# Patient Record
Sex: FEMALE | Race: WHITE | NOT HISPANIC OR LATINO | Employment: FULL TIME | URBAN - METROPOLITAN AREA
[De-identification: names, ages, dates, MRNs, and addresses within clinical notes are randomized per-mention and may not be internally consistent; named-entity substitution may affect disease eponyms.]

---

## 2023-06-04 ENCOUNTER — OFFICE VISIT (OUTPATIENT)
Dept: URGENT CARE | Facility: CLINIC | Age: 58
End: 2023-06-04
Payer: COMMERCIAL

## 2023-06-04 VITALS
OXYGEN SATURATION: 98 % | RESPIRATION RATE: 16 BRPM | DIASTOLIC BLOOD PRESSURE: 66 MMHG | SYSTOLIC BLOOD PRESSURE: 90 MMHG | TEMPERATURE: 97.1 F | HEART RATE: 78 BPM

## 2023-06-04 DIAGNOSIS — S10.86XA INSECT BITE OF OTHER PART OF NECK, INITIAL ENCOUNTER: Primary | ICD-10-CM
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
## 2023-06-04 DIAGNOSIS — R21 RASH: ICD-10-CM

## 2023-06-04 PROBLEM — F41.9 ANXIETY: Status: ACTIVE | Noted: 2023-02-14

## 2023-06-04 PROCEDURE — 99203 OFFICE O/P NEW LOW 30 MIN: CPT | Performed by: ORTHOPAEDIC SURGERY

## 2023-06-04 RX ORDER — ESTRADIOL 0.1 MG/G
CREAM VAGINAL
COMMUNITY
Start: 2023-05-21

## 2023-06-04 RX ORDER — DOXYCYCLINE 100 MG/1
100 TABLET ORAL 2 TIMES DAILY
Qty: 28 TABLET | Refills: 0 | Status: SHIPPED | OUTPATIENT
Start: 2023-06-04 | End: 2023-06-18

## 2023-06-04 RX ORDER — BUPROPION HYDROCHLORIDE 150 MG/1
1 TABLET ORAL DAILY
COMMUNITY
Start: 2023-04-15

## 2023-06-04 RX ORDER — LORAZEPAM 1 MG/1
TABLET ORAL
COMMUNITY
Start: 2023-03-07

## 2023-06-04 NOTE — PATIENT INSTRUCTIONS
Suspicious bullseye rash  Will treat with 14 days of Doxycycline  Please take as directed  Monitor for worsening symptoms, f/u with PCP to discuss needs for antibody testing for lyme's disease in 4-6 weeks  F/u as needed for persistent or worsening symptoms  Tick Bite   WHAT YOU NEED TO KNOW:   What do I need to know about a tick bite? Ticks need to be removed quickly  Most tick bites are not dangerous, but ticks can pass disease or infection when they bite  Diseases include Lyme disease, babesiosis, tularemia, and [place] Spotted Fever  What are the signs and symptoms of a tick bite? Signs and symptoms may develop right away, or weeks or even months after a bite  You may have redness, pain, itching, and swelling near the bite area  Blisters may also develop  You may develop tick paralysis, a temporary condition that causes problems with leg movement  A disease from a tick bite may cause a fever, rash, body aches, or breathing problems  How do I remove a tick? Remove the tick as soon as possible to help prevent disease or infection  You are less likely to get sick from a tick bite if you remove the tick within 24 hours  Do not use petroleum jelly, nail polish, rubbing alcohol, or heat  These do not work and may be dangerous  Do the following to remove a tick:  First, try a soapy cotton ball  Soak a cotton ball in liquid soap  Cover the tick with the cotton ball for 30 seconds  The tick may come off with the cotton ball when you pull it away  Use tweezers if the soapy cotton ball does not work  Grasp the tick as close to your skin as possible  Pull the tick straight up and out  Do not touch the tick with your bare hands  Do not twist or jerk the tick suddenly, because this may break off the tick's head or mouth parts  Do not leave any part of the tick in your skin  Do not crush or squeeze the tick since its body may be infected with germs  Flush the tick down the toilet      After the tick is removed, clean the area of the bite with rubbing alcohol  Then wash your hands with soap and water  How is a tick bite treated? Treatment for a disease passed during the bite depends on the disease  You may only need medicines to lower a fever or fight a bacterial infection  More serious diseases can cause conditions such as breathing problems that need to be treated in a hospital  The following can help treat symptoms at the bite area:  Apply ice  on your bite for 15 to 20 minutes every hour or as directed  Use an ice pack, or put crushed ice in a plastic bag  Cover it with a towel before you apply it to your skin  Ice helps prevent tissue damage and decreases swelling and pain  Medicines  help decrease pain, redness, itching, and swelling  You may also need medicine to prevent or fight a bacterial infection  These medicines may be given as a cream, lotion, or pill  How can I prevent a tick bite? Ticks live in areas covered by brush and grass  They may even be found in your lawn if you live in certain areas  Outdoor pets can carry ticks inside the house  Ticks can grab onto you or your clothes when you walk by grass or brush  If you go into areas that contain many trees, tall grasses, and underbrush, do the following:  Wear light colored pants and a long-sleeved shirt  Tuck your pants into your socks or boots  Tuck in your shirt  Wear sleeves that fit close to the skin at your wrists and neck  This will help prevent ticks from crawling through gaps in your clothing and onto your skin  Wear a hat in areas with trees  Apply insect repellant on your skin  The insect repellant should contain DEET  Do not put insect repellant on skin that is cut, scratched, or irritated  Always use soap and water to wash the insect repellant off as soon as possible once you are indoors  Do not apply insect repellant on your child's face or hands  Spray insect repellant onto your clothes  Use permethrin spray   This spray kills ticks that crawl on your clothing  Be sure to spray the tops of your boots, bottom of pant legs, and sleeve cuffs  As soon as possible, wash and dry clothing in hot water and high heat  Check your clothing, hair, and skin for ticks  Shower within 2 hours of coming indoors  Carefully check the hairline, armpits, neck, and waist  Check your pets and children for ticks  Remove ticks from pets the same way as you remove them from people  Decrease the risk for ticks in your yard  Ticks like to live in shady, moist areas  [employer] your lawn regularly to keep the grass short  Trim the grass around birdbaths and fences  Cut branches that are overgrown and take them out of the yard  Clear out leaf piles  [employer] firewood in a dry, abby area  When should I seek immediate care? You have trouble walking or moving your legs  You have joint pain, muscle pain, or muscle weakness within 1 month of a tick bite  You have a fever, chills, headache, or rash  When should I call my doctor? You cannot remove the tick  The tick's head is stuck in your skin  You have questions or concerns about your condition or care  CARE AGREEMENT:   You have the right to help plan your care  Learn about your health condition and how it may be treated  Discuss treatment options with your healthcare providers to decide what care you want to receive  You always have the right to refuse treatment  The above information is an  only  It is not intended as medical advice for individual conditions or treatments  Talk to your doctor, nurse or pharmacist before following any medical regimen to see if it is safe and effective for you  © Copyright [employer] 2022 Information is for End User's use only and may not be sold, redistributed or otherwise used for commercial purposes

## 2023-06-04 NOTE — PROGRESS NOTES
[facility]        NAME: Fadi Brothers is a 63 y o  female  : 1965    MRN: [id number]  DATE: 2023  TIME: 11:59 AM    Assessment and Plan   Insect bite of other part of neck, initial encounter [S10 [id number]  XXXA]  1  Insect bite of other part of neck, initial encounter  doxycycline (ADOXA) 100 MG tablet      2  Rash  doxycycline (ADOXA) 100 MG tablet            Patient Instructions       Follow up with PCP in 3-5 days  Proceed to  ER if symptoms worsen  Patient Instructions     Suspicious bullseye rash  Will treat with 14 days of Doxycycline  Please take as directed  Monitor for worsening symptoms, f/u with PCP to discuss needs for antibody testing for lyme's disease in 4-6 weeks  F/u as needed for persistent or worsening symptoms  Tick Bite   WHAT YOU NEED TO KNOW:   What do I need to know about a tick bite? Ticks need to be removed quickly  Most tick bites are not dangerous, but ticks can pass disease or infection when they bite  Diseases include Lyme disease, babesiosis, tularemia, and [place] Spotted Fever  What are the signs and symptoms of a tick bite? Signs and symptoms may develop right away, or weeks or even months after a bite  You may have redness, pain, itching, and swelling near the bite area  Blisters may also develop  You may develop tick paralysis, a temporary condition that causes problems with leg movement  A disease from a tick bite may cause a fever, rash, body aches, or breathing problems  How do I remove a tick? Remove the tick as soon as possible to help prevent disease or infection  You are less likely to get sick from a tick bite if you remove the tick within 24 hours  Do not use petroleum jelly, nail polish, rubbing alcohol, or heat  These do not work and may be dangerous  Do the following to remove a tick:  • First, try a soapy cotton ball  Soak a cotton ball in liquid soap  Cover the tick with the cotton ball for 30 seconds   The tick may come off with the cotton ball when you pull it away  • Use tweezers if the soapy cotton ball does not work  Grasp the tick as close to your skin as possible  Pull the tick straight up and out  Do not touch the tick with your bare hands  • Do not twist or jerk the tick suddenly, because this may break off the tick's head or mouth parts  Do not leave any part of the tick in your skin  • Do not crush or squeeze the tick since its body may be infected with germs  Flush the tick down the toilet  • After the tick is removed, clean the area of the bite with rubbing alcohol  Then wash your hands with soap and water  How is a tick bite treated? Treatment for a disease passed during the bite depends on the disease  You may only need medicines to lower a fever or fight a bacterial infection  More serious diseases can cause conditions such as breathing problems that need to be treated in a hospital  The following can help treat symptoms at the bite area:  • Apply ice  on your bite for 15 to 20 minutes every hour or as directed  Use an ice pack, or put crushed ice in a plastic bag  Cover it with a towel before you apply it to your skin  Ice helps prevent tissue damage and decreases swelling and pain  • Medicines  help decrease pain, redness, itching, and swelling  You may also need medicine to prevent or fight a bacterial infection  These medicines may be given as a cream, lotion, or pill  How can I prevent a tick bite? Ticks live in areas covered by brush and grass  They may even be found in your lawn if you live in certain areas  Outdoor pets can carry ticks inside the house  Ticks can grab onto you or your clothes when you walk by grass or brush  If you go into areas that contain many trees, tall grasses, and underbrush, do the following:  • Wear light colored pants and a long-sleeved shirt  Tuck your pants into your socks or boots  Tuck in your shirt   Wear sleeves that fit close to the skin at your wrists and neck  This will help prevent ticks from crawling through gaps in your clothing and onto your skin  Wear a hat in areas with trees  • Apply insect repellant on your skin  The insect repellant should contain DEET  Do not put insect repellant on skin that is cut, scratched, or irritated  Always use soap and water to wash the insect repellant off as soon as possible once you are indoors  Do not apply insect repellant on your child's face or hands  • Spray insect repellant onto your clothes  Use permethrin spray  This spray kills ticks that crawl on your clothing  Be sure to spray the tops of your boots, bottom of pant legs, and sleeve cuffs  As soon as possible, wash and dry clothing in hot water and high heat  • Check your clothing, hair, and skin for ticks  Shower within 2 hours of coming indoors  Carefully check the hairline, armpits, neck, and waist  Check your pets and children for ticks  Remove ticks from pets the same way as you remove them from people  • Decrease the risk for ticks in your yard  Ticks like to live in shady, moist areas  [place] your lawn regularly to keep the grass short  Trim the grass around birdbaths and fences  Cut branches that are overgrown and take them out of the yard  Clear out [facility] in a dry, abby area  When should I seek immediate care? • You have trouble walking or moving your legs  • You have joint pain, muscle pain, or muscle weakness within 1 month of a tick bite  • You have a fever, chills, headache, or rash  When should I call my doctor? • You cannot remove the tick  • The tick's head is stuck in your skin  • You have questions or concerns about your condition or care  CARE AGREEMENT:   You have the right to help plan your care  Learn about your health condition and how it may be treated  Discuss treatment options with your healthcare providers to decide what care you want to receive   You always have the right to refuse treatment  The above information is an  only  It is not intended as medical advice for individual conditions or treatments  Talk to your doctor, nurse or pharmacist before following any medical regimen to see if it is safe and effective for you  © Copyright [employer] [facility] 2022 Information is for End User's use only and may not be sold, redistributed or otherwise used for commercial purposes  Chief Complaint     Chief Complaint   Patient presents with   • Tick Removal     Left collar bone area, doesn't resemble a full bullseye, so unsure if its a tick bite or not   noticed yesterday morning         History of Present Illness       Pt arrives with possible insect bite on Left clavicle area, more noticeable over the past 2-3 days  Afebrile  Denies any itching, N/V/D, positive posterior neck, upper shoulder pain with onset of insect bite  No known tick bite, mosquito or spider bite  No previous h/o tick bites or lyme disease  Denies joint swelling, HA, or dizziness  Review of Systems   Review of Systems   Constitutional: Negative for fever  HENT: Negative for congestion, ear pain and sore throat  Eyes: Negative for discharge and redness  Respiratory: Negative for cough and wheezing  Cardiovascular: Negative for chest pain  Gastrointestinal: Negative for abdominal pain, diarrhea, nausea and vomiting  Musculoskeletal: Positive for neck pain  Negative for myalgias  Skin: Positive for rash  Negative for pallor  Allergic/Immunologic: Negative for environmental allergies  Neurological: Negative for dizziness and headaches  Hematological: Negative for adenopathy  Psychiatric/Behavioral: Negative for confusion           Current Medications       Current Outpatient Medications:   •  buPROPion (WELLBUTRIN XL) 150 mg 24 hr tablet, Take 1 tablet by mouth daily, Disp: , Rfl:   •  denosumab (PROLIA) 60 mg/mL, Inject 60 mg under the skin, Disp: , Rfl:   •  doxycycline (ADOXA) 100 MG tablet, Take 1 tablet (100 mg total) by mouth 2 (two) times a day for 14 days, Disp: 28 tablet, Rfl: 0  •  estradiol (ESTRACE) 0 1 mg/g vaginal cream, APPLY AS DIRECTED, SMALL PEA SIZED AMOUNT NIGHTLY THROW AWAY APPLICATOR, Disp: , Rfl:   •  LORazepam (ATIVAN) 1 mg tablet, TAKE 1 TABLET BY MOUTH EVERY SIX HOURS AS NEEDED STRENGTH: 1 MG, Disp: , Rfl:     Current Allergies     Allergies as of 06/04/2023 - Reviewed 06/04/2023   Allergen Reaction Noted   • Sulfa antibiotics Other (See Comments) 12/19/2019            The following portions of the patient's history were reviewed and updated as appropriate: allergies, current medications, past family history, past medical history, past social history, past surgical history and problem list      History reviewed  No pertinent past medical history  No past surgical history on file  No family history on file  Medications have been verified  Objective   BP 90/66   Pulse 78   Temp (!) 97 1 °F (36 2 °C)   Resp 16   SpO2 98%        Physical Exam     Physical Exam  Vitals and nursing note reviewed  Constitutional:       General: She is not in acute distress  Appearance: She is well-developed and well-groomed  She is not ill-appearing  HENT:      Head: Normocephalic  Right Ear: Tympanic membrane and ear canal normal       Left Ear: Tympanic membrane and ear canal normal       Nose: Nose normal  No congestion or rhinorrhea  Mouth/Throat:      Lips: Pink  Mouth: Mucous membranes are moist       Pharynx: Oropharynx is clear  Eyes:      General: Lids are normal          Right eye: No discharge  Left eye: No discharge  Cardiovascular:      Rate and Rhythm: Regular rhythm  Heart sounds: Normal heart sounds, S1 normal and S2 normal    Pulmonary:      Effort: Pulmonary effort is normal       Breath sounds: Normal breath sounds and air entry  No wheezing or rhonchi     Musculoskeletal:      Cervical back: Normal range of motion  Lymphadenopathy:      Cervical: No cervical adenopathy  Skin:     General: Skin is warm and dry  Comments: Single pinpoint macule with mild periwound erythema, central clearing with possible bullseye rash on left medial superior clavicle  Neurological:      Mental Status: She is alert  Psychiatric:         Speech: Speech normal          Behavior: Behavior is cooperative